# Patient Record
Sex: FEMALE | Race: WHITE
[De-identification: names, ages, dates, MRNs, and addresses within clinical notes are randomized per-mention and may not be internally consistent; named-entity substitution may affect disease eponyms.]

---

## 2018-05-06 ENCOUNTER — HOSPITAL ENCOUNTER (EMERGENCY)
Dept: HOSPITAL 17 - PHED | Age: 73
Discharge: HOME | End: 2018-05-06
Payer: MEDICARE

## 2018-05-06 VITALS
HEART RATE: 93 BPM | DIASTOLIC BLOOD PRESSURE: 76 MMHG | RESPIRATION RATE: 16 BRPM | OXYGEN SATURATION: 95 % | SYSTOLIC BLOOD PRESSURE: 140 MMHG | TEMPERATURE: 98.9 F

## 2018-05-06 DIAGNOSIS — I10: ICD-10-CM

## 2018-05-06 DIAGNOSIS — R09.82: Primary | ICD-10-CM

## 2018-05-06 DIAGNOSIS — Z79.899: ICD-10-CM

## 2018-05-06 DIAGNOSIS — Z88.5: ICD-10-CM

## 2018-05-06 DIAGNOSIS — J34.89: ICD-10-CM

## 2018-05-06 DIAGNOSIS — R05: ICD-10-CM

## 2018-05-06 DIAGNOSIS — Z88.0: ICD-10-CM

## 2018-05-06 PROCEDURE — 99283 EMERGENCY DEPT VISIT LOW MDM: CPT

## 2018-05-06 NOTE — PD
HPI


Chief Complaint:  Cold / Flu Symptoms


Time Seen by Provider:  08:19


Travel History


International Travel<30 days:  No


Contact w/Intl Traveler<30days:  No


Traveled to known affect area:  No





History of Present Illness


HPI


72-year-old female complains of cough for about 1 week.  Keflex has not helped.

  Associated symptoms include right middle ear effusion and possible sinusitis.

  Falls cough drops or helpful.  No fever.  No chest pain or shortness of 

breath.  Patient has had difficulty sleeping because of the cough.  Patient 

also reports rhinorrhea.  She denies GERD.  No history of asthma or COPD.





PFSH


Past Medical History


Arthritis:  No


Blood Disorders:  No


Heart Rhythm Problems:  No


Cancer:  Yes (skin - removed)


High Cholesterol:  No


Chemotherapy:  No


Chest Pain:  No


Congestive Heart Failure:  No


Diabetes:  No


Diminished Hearing:  No


Endocrine:  No


Headaches:  Yes


Hypertension:  Yes


Immune Disorder:  No


Musculoskeletal:  No


Neurologic:  No


Psychiatric:  No


Respiratory:  No


Myocardial Infarction:  No


Radiation Therapy:  No


Influenza Vaccination:  No


Pregnant?:  Not Pregnant





Past Surgical History


Abdominal Surgery:  Yes (exp lap)


AICD:  No


Arteriovenous Shunt:  No


Cardiac Surgery:  No


Ear Surgery:  No


Endocrine Surgery:  No


Eye Surgery:  No


Genitourinary Surgery:  No


Gynecologic Surgery:  Yes (BILAT OOPHRECTOMY, CYSTS REMOVED FROM BREASTS.)


Hysterectomy:  Yes


Insulin Pump:  No


Joint Replacement:  No


Oral Surgery:  No


Pacemaker:  No


Thoracic Surgery:  No


Tonsillectomy:  Yes


Other Surgery:  Yes (breast surgery for fibroids)





Social History


Alcohol Use:  Yes (OCCAISIONAL)


Tobacco Use:  No


Substance Use:  No





Allergies-Medications


(Allergen,Severity, Reaction):  


Coded Allergies:  


     erythromycin base (Verified  Allergy, Mild, vomiting, 5/6/18)


     penicillin G (Verified  Allergy, Unknown, 5/6/18)


     hydromorphone (Verified  Adverse Reaction, Severe, VOMITING, 5/6/18)


Reported Meds & Prescriptions





Reported Meds & Active Scripts


Active


Reported


Lisinopril-Hctz 20-12.5 Mg Tab 1 Tab PO DAILY


Keflex (Cephalexin) 500 Mg Cap 500 Mg PO Q8H








Review of Systems


Except as stated in HPI:  all other systems reviewed are Neg


General / Constitutional:  No: Fever





Physical Exam


Narrative


GENERAL: 72-year-old female pleasant well-nourished well-developed occasional 

cough





Vital Signs








  Date Time  Temp Pulse Resp B/P (MAP) Pulse Ox O2 Delivery O2 Flow Rate FiO2


 


5/6/18 08:12   17  95 Room Air  


 


5/6/18 07:31 98.9 93 16 140/76 (97) 95   








SKIN: Warm and dry.


HEAD: Atraumatic. Normocephalic. 


EYES: Pupils equal and round. No scleral icterus. No injection or drainage. 


ENT: No nasal bleeding or discharge.  Mucous membranes pink and moist.There is 

no middle ear effusion or erythema about the tympanic membrane on a side.  No 

mastoid tenderness.  Posterior oropharynx is widely patent.  There is no 

exudate or asymmetry.


NECK: Trachea midline. No JVD.  


CARDIOVASCULAR: Regular rate and rhythm.  


RESPIRATORY: No accessory muscle use. Clear to auscultation. Breath sounds 

equal bilaterally. 


GASTROINTESTINAL: Abdomen soft, non-tender, nondistended. Hepatic and splenic 

margins not palpable. 


MUSCULOSKELETAL: Extremities without clubbing, cyanosis, or edema. No obvious 

deformities. 


NEUROLOGICAL: Awake and alert. No obvious cranial nerve deficits.  Motor 

grossly within normal limits. Five out of 5 muscle strength in the arms and 

legs.  Normal speech.


PSYCHIATRIC: Appropriate mood and affect; insight and judgment normal.





Data


Data


Last Documented VS





Vital Signs








  Date Time  Temp Pulse Resp B/P (MAP) Pulse Ox O2 Delivery O2 Flow Rate FiO2


 


5/6/18 08:12   17  95 Room Air  


 


5/6/18 07:31 98.9 93  140/76 (97)    











[facility]


Medical Decision Making


Medical Screen Exam Complete:  Yes


Emergency Medical Condition:  Yes


Medical Record Reviewed:  Yes


Differential Diagnosis


Postnasal drip, GERD, lisinopril side effect


Narrative Course


Patient with cough for one and a half weeks.  Scripts as below.





Diagnosis





 Primary Impression:  


 Post-nasal drip


Referrals:  


Primary Care Physician


call for appointment


***Med/Other Pt SpecificInfo:  Prescription(s) given


Scripts


Albuterol 6.7 GM Inh (Proventil Hfa 6.7 GM Inh) 90 Mcg/Act Aer


2 PUFF INH Q4-6H Y for SHORTNESS OF BREATH for 7 Days, #1 INHALER 0 Refills


   Prov: Joaquin Brasher MD         5/6/18 


Triamcinolone Nasal (Nasal Allergy 24 Hour) 55 Mcg Spr


1 SPRAY EACH NARE DAILY for Allergies for 7 Days, #1 BOTTLE 0 Refills


   Prov: Joaquin Brasher MD         5/6/18 


Loratadine-Pseudoephedrine 24 HR (Claritin-D 24 HR)  Mg Tab


1 TAB PO DAILY for Allergy Management for 30 Days, #30 TAB 0 Refills


   Prov: Joaquin Brasher MD         5/6/18 


Guaifenesin-Codeine Liq (Guaifenesin-Codeine Liq) 100-10 Mg/5 Ml Soln


10 ML PO Q6H Y for COUGH for 7 Days, #1 BOTTLE 0 Refills


   Prov: Joaquin Brasher MD         5/6/18 


Benzonatate (Tessalon Perles) 100 Mg Cap


100 MG PO TID Y for COUGH, #30 CAP 0 Refills


   Prov: Joaquin Brasher MD         5/6/18


Disposition:  01 DISCHARGE HOME


Condition:  Stable











Joaquin Brasher MD May 6, 2018 08:32

## 2018-06-23 ENCOUNTER — HOSPITAL ENCOUNTER (EMERGENCY)
Dept: HOSPITAL 17 - PHEFT | Age: 73
Discharge: HOME | End: 2018-06-23
Payer: MEDICARE

## 2018-06-23 VITALS
TEMPERATURE: 98.6 F | RESPIRATION RATE: 16 BRPM | SYSTOLIC BLOOD PRESSURE: 171 MMHG | HEART RATE: 78 BPM | OXYGEN SATURATION: 97 % | DIASTOLIC BLOOD PRESSURE: 73 MMHG

## 2018-06-23 VITALS — BODY MASS INDEX: 28.91 KG/M2 | WEIGHT: 163.14 LBS | HEIGHT: 63 IN

## 2018-06-23 DIAGNOSIS — Z88.1: ICD-10-CM

## 2018-06-23 DIAGNOSIS — Z88.0: ICD-10-CM
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
## 2018-06-23 DIAGNOSIS — Z88.5: ICD-10-CM

## 2018-06-23 DIAGNOSIS — S39.012A: Primary | ICD-10-CM

## 2018-06-23 DIAGNOSIS — I10: ICD-10-CM

## 2018-06-23 LAB
COLOR UR: YELLOW
GLUCOSE UR STRIP-MCNC: (no result) MG/DL
HGB UR QL STRIP: (no result)
KETONES UR STRIP-MCNC: (no result) MG/DL
LEUKOCYTE ESTERASE UR QL STRIP: (no result) /HPF (ref 0–5)
NITRITE UR QL STRIP: (no result)
RBC #/AREA URNS HPF: (no result) /HPF (ref 0–3)
SP GR UR STRIP: (no result) (ref 1–1.03)
SQUAMOUS #/AREA URNS HPF: (no result) /HPF (ref 0–5)
URINE LEUKOCYTE ESTERASE: (no result)

## 2018-06-23 PROCEDURE — 81001 URINALYSIS AUTO W/SCOPE: CPT

## 2018-06-23 PROCEDURE — 96372 THER/PROPH/DIAG INJ SC/IM: CPT

## 2018-06-23 PROCEDURE — 99283 EMERGENCY DEPT VISIT LOW MDM: CPT

## 2018-06-23 NOTE — PD
HPI


Chief Complaint:  Musculoskeletal Complaint


Time Seen by Provider:  13:24


Travel History


International Travel<30 days:  No


Contact w/Intl Traveler<30days:  No


Traveled to known affect area:  No





History of Present Illness


HPI


72-year-old female presents to the emergency department for evaluation of left 

low back pain that radiates to the left hip.  Patient states the pain started 

yesterday.  She states that certain movements will exacerbate the pain.  Pain 

is currently 5/10, throbbing patient denies any traumatic injury.  She states 

she is moving her mother-in-law to a nursing home and has been helping with her 

2-year-old grandson.  She denies any fevers or chills.  No loss of bowel or 

bladder control.  No saddle anesthesias.  Patient denies any urinary symptoms.  

No abdominal pain.  No nausea, vomiting, diarrhea.  She last took Aleve around 

1130 this morning for her pain.  Patient has history of hypertension and is on 

lisinopril.  Mild severity.





PFSH


Past Medical History


Arthritis:  No


Blood Disorders:  No


Heart Rhythm Problems:  No


Cancer:  Yes (skin - removed)


High Cholesterol:  No


Chemotherapy:  No


Chest Pain:  No


Congestive Heart Failure:  No


Diabetes:  No


Diminished Hearing:  No


Endocrine:  No


Gastrointestinal Disorders:  No


Headaches:  Yes


Hypertension:  Yes


Immune Disorder:  No


Implanted Vascular Access Dvce:  No


Musculoskeletal:  No


Neurologic:  No


Psychiatric:  No


Respiratory:  No


Myocardial Infarction:  No


Radiation Therapy:  No


Tetanus Vaccination:  Unknown


Pregnant?:  Not Pregnant





Past Surgical History


Abdominal Surgery:  Yes (exp lap)


AICD:  No


Arteriovenous Shunt:  No


Cardiac Surgery:  No


Ear Surgery:  No


Endocrine Surgery:  No


Eye Surgery:  No


Genitourinary Surgery:  No


Gynecologic Surgery:  Yes (BILAT OOPHRECTOMY, CYSTS REMOVED FROM BREASTS.)


Hysterectomy:  Yes


Insulin Pump:  No


Joint Replacement:  No


Neurologic Surgery:  No


Oral Surgery:  No


Pacemaker:  No


Thoracic Surgery:  No


Tonsillectomy:  Yes


Other Surgery:  Yes (breast surgery for fibroids)





Social History


Alcohol Use:  Yes (OCCAISIONAL)


Tobacco Use:  No


Substance Use:  No





Allergies-Medications


(Allergen,Severity, Reaction):  


Coded Allergies:  


     erythromycin base (Verified  Allergy, Mild, vomiting, 6/23/18)


     penicillin G (Verified  Allergy, Unknown, 6/23/18)


     hydromorphone (Verified  Adverse Reaction, Severe, VOMITING, 6/23/18)


Reported Meds & Prescriptions





Reported Meds & Active Scripts


Active


Reported


Lisinopril-Hctz 20-12.5 Mg Tab 1 Tab PO DAILY








Review of Systems


Except as stated in HPI:  all other systems reviewed are Neg





Physical Exam


Narrative


GENERAL: Well-nourished, well-developed elderly female patient, afebrile.


SKIN: Focused skin assessment warm/dry.


HEAD: Normocephalic.  Atraumatic.


EYES: No scleral icterus. No injection or drainage. 


NECK: Supple, trachea midline. No JVD or lymphadenopathy.


CARDIOVASCULAR: Regular rate and rhythm without murmurs, gallops, or rubs. 


RESPIRATORY: Breath sounds equal bilaterally. No accessory muscle use.  Lung 

sounds are clear to auscultation.


GASTROINTESTINAL: Abdomen soft, non-tender, nondistended. 


MUSCULOSKELETAL: No cyanosis, or edema.  Bilateral upper and lower extremity 

strength 5/5.  All extremities are neurovascularly intact.  Patient is 

ambulatory with a steady gait.


BACK: No obvious deformity. No CVA tenderness.  No midline spinal tenderness.  

Straight leg raise is negative bilaterally.  Patient has tenderness over the 

left lumbar paraspinal musculature.





Data


Data


Last Documented VS





Vital Signs








  Date Time  Temp Pulse Resp B/P (MAP) Pulse Ox O2 Delivery O2 Flow Rate FiO2


 


6/23/18 12:45 98.6 78 16 171/73 (105) 97   








Orders





 Orders


Urinalysis - C+S If Indicated (6/23/18 13:30)


Dexamethasone Inj (Decadron Inj) (6/23/18 13:30)


Methocarbamol (Robaxin) (6/23/18 13:30)





Labs





Laboratory Tests








Test


  6/23/18


13:41


 


Urine Collection Type CLEAN CATCH 


 


Urine Color YELLOW 


 


Urine Turbidity CLEAR 


 


Urine pH 5.5 


 


Urine Specific Gravity


  LESS/EQUAL


1.005


 


Urine Protein NEG mg/dL 


 


Urine Glucose (UA) NEG mg/dL 


 


Urine Ketones NEG mg/dL 


 


Urine Occult Blood TRACE 


 


Urine Nitrite NEG 


 


Urine Bilirubin NEG 


 


Urine Urobilinogen 0.2 MG/DL 


 


Urine Leukocyte Esterase SMALL 


 


Urine RBC 0-3 /hpf 


 


Urine WBC 0-2 /hpf 


 


Urine Squamous Epithelial


Cells 0-5 /hpf 


 


 


Microscopic Urinalysis Comment


  CULT NOT


INDICATED


 


Urine Collection Time 1347 











MDM


Medical Decision Making


Medical Screen Exam Complete:  Yes


Emergency Medical Condition:  Yes


Medical Record Reviewed:  Yes


Differential Diagnosis


Muscle strain versus spasm versus sciatica versus herniated disc versus UTI


Narrative Course


72-year-old female presents to the emergency department for evaluation of left 

low back pain.  No traumatic injury.  No red flag symptoms.  Symptoms are most 

consistent with muscle strain.  UA is ordered and pending.  Patient is given 

dexamethasone 8 mg IM, Robaxin 500 mg p.o.





UA is negative.





Patient will be discharged with a prescription for prednisone, Robaxin.  She is 

to continue Aleve at home for pain as well.  





The patient was discharged in stable condition with instructions, including 

return instructions and follow up instructions.





Diagnosis





 Primary Impression:  


 Low back strain


 Qualified Codes:  S39.012A - Strain of muscle, fascia and tendon of lower back

, initial encounter


Referrals:  


Primary Care Physician


call for appointment


Patient Instructions:  Acute Low Back Pain (ED), General Instructions





***Additional Instructions:  


Continue Aleve at home as needed for pain.


Take prednisone as directed.  Start this tomorrow.


Take Robaxin as directed as needed.


Heating pad on low for 20 minutes 4-5 times daily.


Follow-up with a primary care physician.


Return to the emergency department for any acute worsening of symptoms.


***Med/Other Pt SpecificInfo:  Prescription(s) given


Scripts


Methocarbamol (Robaxin) 500 Mg Tab


500 MG PO TID Y for MUSCLE SPASM, #21 TAB 0 Refills


   Prov: Sophie Boogie         6/23/18 


Prednisone (Prednisone) 20 Mg Tab


40 MG PO DAILY, #10 TAB 0 Refills


   Take 40 mg (2 tablets) daily for 5 days


   Prov: Sophie Boogie         6/23/18


Disposition:  01 DISCHARGE HOME


Condition:  Stable











Sophie Boogie Jun 23, 2018 13:33